# Patient Record
Sex: FEMALE | Race: WHITE | URBAN - METROPOLITAN AREA
[De-identification: names, ages, dates, MRNs, and addresses within clinical notes are randomized per-mention and may not be internally consistent; named-entity substitution may affect disease eponyms.]

---

## 2021-05-25 PROBLEM — F11.21 OPIOID TYPE DEPENDENCE IN REMISSION (HCC): Status: ACTIVE | Noted: 2021-05-25

## 2022-01-05 PROBLEM — R53.83 FATIGUE, UNSPECIFIED TYPE: Status: ACTIVE | Noted: 2022-01-05

## 2022-05-26 LAB
AMB EXT CHLAMYDIA, NUCLEIC ACID AMP: NEGATIVE
AMB EXT GONOCOCCUS, NUCLEIC ACID AMP: NEGATIVE

## 2022-06-10 LAB
AMB EXT TREPONEMAL ANTIBODIES: NON REACTIVE
ANTIBODY SCREEN OB: NEGATIVE
HEPATITIS B SURFACE ANTIGEN OB: NEGATIVE
HIV RESULT OB: NEGATIVE
RH FACTOR OB: NEGATIVE

## 2022-07-22 ENCOUNTER — TELEPHONE (OUTPATIENT)
Dept: PERINATAL CARE | Facility: HOSPITAL | Age: 42
End: 2022-07-22

## 2022-08-15 ENCOUNTER — HOSPITAL ENCOUNTER (OUTPATIENT)
Dept: PERINATAL CARE | Facility: HOSPITAL | Age: 42
End: 2022-08-15
Attending: OBSTETRICS & GYNECOLOGY
Payer: COMMERCIAL

## 2022-08-15 ENCOUNTER — HOSPITAL ENCOUNTER (OUTPATIENT)
Dept: PERINATAL CARE | Facility: HOSPITAL | Age: 42
Discharge: HOME | End: 2022-08-15
Attending: OBSTETRICS & GYNECOLOGY
Payer: COMMERCIAL

## 2022-08-15 VITALS
HEART RATE: 93 BPM | SYSTOLIC BLOOD PRESSURE: 122 MMHG | BODY MASS INDEX: 33.57 KG/M2 | HEIGHT: 60 IN | WEIGHT: 171 LBS | DIASTOLIC BLOOD PRESSURE: 73 MMHG

## 2022-08-15 DIAGNOSIS — O35.9XX0 FETAL ABNORMALITY AFFECTING MANAGEMENT OF MOTHER, SINGLE OR UNSPECIFIED FETUS: ICD-10-CM

## 2022-08-15 DIAGNOSIS — F11.21 OPIOID TYPE DEPENDENCE IN REMISSION (HCC): ICD-10-CM

## 2022-08-15 DIAGNOSIS — E66.9 CLASS 1 OBESITY WITHOUT SERIOUS COMORBIDITY WITH BODY MASS INDEX (BMI) OF 33.0 TO 33.9 IN ADULT, UNSPECIFIED OBESITY TYPE: ICD-10-CM

## 2022-08-15 DIAGNOSIS — Z36.89 SCREENING, ANTENATAL, FOR FETAL ANATOMIC SURVEY: ICD-10-CM

## 2022-08-15 DIAGNOSIS — O09.512 AMA (ADVANCED MATERNAL AGE) PRIMIGRAVIDA 35+, SECOND TRIMESTER: Primary | ICD-10-CM

## 2022-08-15 DIAGNOSIS — Z03.75 SUSPECTED SHORTENING OF CERVIX NOT FOUND: ICD-10-CM

## 2022-08-15 PROCEDURE — 76817 TRANSVAGINAL US OBSTETRIC: CPT

## 2022-08-15 PROCEDURE — 76811 OB US DETAILED SNGL FETUS: CPT | Performed by: OBSTETRICS & GYNECOLOGY

## 2022-08-24 ENCOUNTER — HOSPITAL ENCOUNTER (OUTPATIENT)
Dept: PERINATAL CARE | Facility: HOSPITAL | Age: 42
Discharge: HOME | End: 2022-08-24
Attending: OBSTETRICS & GYNECOLOGY
Payer: COMMERCIAL

## 2022-08-24 VITALS
DIASTOLIC BLOOD PRESSURE: 59 MMHG | HEART RATE: 94 BPM | SYSTOLIC BLOOD PRESSURE: 127 MMHG | BODY MASS INDEX: 34 KG/M2 | WEIGHT: 172 LBS

## 2022-08-24 DIAGNOSIS — O26.873 CERVICAL SHORTENING AFFECTING PREGNANCY IN THIRD TRIMESTER: Primary | ICD-10-CM

## 2022-08-24 DIAGNOSIS — O26.873 CERVICAL SHORTENING AFFECTING PREGNANCY IN THIRD TRIMESTER: ICD-10-CM

## 2022-08-24 DIAGNOSIS — O09.512 AMA (ADVANCED MATERNAL AGE) PRIMIGRAVIDA 35+, SECOND TRIMESTER: ICD-10-CM

## 2022-08-24 DIAGNOSIS — E66.9 CLASS 1 OBESITY WITHOUT SERIOUS COMORBIDITY WITH BODY MASS INDEX (BMI) OF 33.0 TO 33.9 IN ADULT, UNSPECIFIED OBESITY TYPE: ICD-10-CM

## 2022-08-24 PROCEDURE — 76817 TRANSVAGINAL US OBSTETRIC: CPT | Performed by: OBSTETRICS & GYNECOLOGY

## 2022-08-24 PROCEDURE — 76815 OB US LIMITED FETUS(S): CPT

## 2022-09-02 ENCOUNTER — HOSPITAL ENCOUNTER (OUTPATIENT)
Dept: PERINATAL CARE | Facility: HOSPITAL | Age: 42
Discharge: HOME | End: 2022-09-02
Attending: OBSTETRICS & GYNECOLOGY
Payer: COMMERCIAL

## 2022-09-02 VITALS
SYSTOLIC BLOOD PRESSURE: 136 MMHG | BODY MASS INDEX: 34 KG/M2 | HEART RATE: 93 BPM | WEIGHT: 172 LBS | DIASTOLIC BLOOD PRESSURE: 76 MMHG

## 2022-09-02 DIAGNOSIS — O26.879 SHORT CERVIX AFFECTING PREGNANCY: ICD-10-CM

## 2022-09-02 DIAGNOSIS — O09.512 AMA (ADVANCED MATERNAL AGE) PRIMIGRAVIDA 35+, SECOND TRIMESTER: Primary | ICD-10-CM

## 2022-09-02 DIAGNOSIS — O09.512 AMA (ADVANCED MATERNAL AGE) PRIMIGRAVIDA 35+, SECOND TRIMESTER: ICD-10-CM

## 2022-09-02 PROCEDURE — 76816 OB US FOLLOW-UP PER FETUS: CPT | Performed by: OBSTETRICS & GYNECOLOGY

## 2022-09-02 PROCEDURE — 76817 TRANSVAGINAL US OBSTETRIC: CPT

## 2022-09-02 RX ORDER — PROGESTERONE 200 MG/1
200 CAPSULE ORAL NIGHTLY
Qty: 30 CAPSULE | Refills: 6 | Status: SHIPPED | OUTPATIENT
Start: 2022-09-02

## 2022-09-27 LAB
AMB EXT TREPONEMAL ANTIBODIES: NON REACTIVE
HIV RESULT OB: NEGATIVE

## 2022-10-28 ENCOUNTER — HOSPITAL ENCOUNTER (OUTPATIENT)
Dept: PERINATAL CARE | Facility: HOSPITAL | Age: 42
Discharge: HOME | End: 2022-10-28
Attending: OBSTETRICS & GYNECOLOGY
Payer: COMMERCIAL

## 2022-10-28 DIAGNOSIS — E66.9 CLASS 1 OBESITY WITHOUT SERIOUS COMORBIDITY WITH BODY MASS INDEX (BMI) OF 33.0 TO 33.9 IN ADULT, UNSPECIFIED OBESITY TYPE: ICD-10-CM

## 2022-10-28 DIAGNOSIS — F11.21 OPIOID TYPE DEPENDENCE IN REMISSION (HCC): ICD-10-CM

## 2022-10-28 DIAGNOSIS — O09.512 AMA (ADVANCED MATERNAL AGE) PRIMIGRAVIDA 35+, SECOND TRIMESTER: ICD-10-CM

## 2022-10-31 ENCOUNTER — TELEPHONE (OUTPATIENT)
Dept: PERINATAL CARE | Facility: HOSPITAL | Age: 42
End: 2022-10-31

## 2022-10-31 ENCOUNTER — HOSPITAL ENCOUNTER (OUTPATIENT)
Dept: PERINATAL CARE | Facility: HOSPITAL | Age: 42
Discharge: HOME | End: 2022-10-31
Attending: OBSTETRICS & GYNECOLOGY
Payer: COMMERCIAL

## 2022-10-31 ENCOUNTER — HOSPITAL ENCOUNTER (OUTPATIENT)
Dept: PERINATAL CARE | Facility: HOSPITAL | Age: 42
End: 2022-10-31
Attending: OBSTETRICS & GYNECOLOGY
Payer: COMMERCIAL

## 2022-10-31 VITALS
DIASTOLIC BLOOD PRESSURE: 71 MMHG | WEIGHT: 184 LBS | HEART RATE: 93 BPM | SYSTOLIC BLOOD PRESSURE: 140 MMHG | BODY MASS INDEX: 36 KG/M2

## 2022-10-31 DIAGNOSIS — O09.512 AMA (ADVANCED MATERNAL AGE) PRIMIGRAVIDA 35+, SECOND TRIMESTER: Primary | ICD-10-CM

## 2022-10-31 DIAGNOSIS — E66.9 CLASS 1 OBESITY WITHOUT SERIOUS COMORBIDITY WITH BODY MASS INDEX (BMI) OF 33.0 TO 33.9 IN ADULT, UNSPECIFIED OBESITY TYPE: ICD-10-CM

## 2022-10-31 DIAGNOSIS — O09.512 AMA (ADVANCED MATERNAL AGE) PRIMIGRAVIDA 35+, SECOND TRIMESTER: ICD-10-CM

## 2022-10-31 DIAGNOSIS — F11.21 OPIOID TYPE DEPENDENCE IN REMISSION (HCC): ICD-10-CM

## 2022-10-31 DIAGNOSIS — O24.414 INSULIN CONTROLLED GESTATIONAL DIABETES MELLITUS (GDM) IN THIRD TRIMESTER: ICD-10-CM

## 2022-10-31 PROCEDURE — 76819 FETAL BIOPHYS PROFIL W/O NST: CPT

## 2022-10-31 PROCEDURE — 76816 OB US FOLLOW-UP PER FETUS: CPT | Performed by: OBSTETRICS & GYNECOLOGY

## 2022-10-31 RX ORDER — INSULIN DETEMIR 100 [IU]/ML
20 INJECTION, SOLUTION SUBCUTANEOUS NIGHTLY
Qty: 18 ML | Refills: 0 | Status: SHIPPED | OUTPATIENT
Start: 2022-10-31 | End: 2023-01-29

## 2022-10-31 NOTE — TELEPHONE ENCOUNTER
Pt contacted regarding use of blood glucose log. Pt aware to record sugars daily and that MFM will be reviewing weekly.

## 2022-11-07 ENCOUNTER — TELEPHONE (OUTPATIENT)
Dept: PERINATAL CARE | Facility: HOSPITAL | Age: 42
End: 2022-11-07

## 2022-11-07 RX ORDER — INSULIN DETEMIR 100 [IU]/ML
36 INJECTION, SOLUTION SUBCUTANEOUS NIGHTLY
Qty: 15 ML | Refills: 1 | Status: SHIPPED | OUTPATIENT
Start: 2022-11-07 | End: 2023-02-05

## 2022-11-07 NOTE — TELEPHONE ENCOUNTER
Pt  33 4/7 weeks  Blood glucose log from  11/1-11/6     Low  High Out of Range   Fasting Blood Sugar 95 108 6/6   Post Breakfast 110 204 4/6   Post Lunch 117 149 3/6   Post Dinner 103 167 5/6     Pt states post lunch reading of 204 mg/dl was due to drinking juice    Taking    levemir 20 untis QHS

## 2022-11-14 ENCOUNTER — TELEPHONE (OUTPATIENT)
Dept: PERINATAL CARE | Facility: HOSPITAL | Age: 42
End: 2022-11-14

## 2022-11-14 RX ORDER — INSULIN DETEMIR 100 [IU]/ML
42 INJECTION, SOLUTION SUBCUTANEOUS NIGHTLY
Qty: 12.6 ML | Refills: 2 | Status: SHIPPED | OUTPATIENT
Start: 2022-11-14 | End: 2023-02-12

## 2022-11-14 NOTE — TELEPHONE ENCOUNTER
Pt  34 4/7 weeks  Blood glucose log from  11/7-11/13     Low  High Out of Range   Fasting Blood Sugar 90 100 4/5   Post Breakfast 108 129 1/5   Post Lunch 115 130 4/5   Post Dinner 116 131 3/5       Taking    Levemir 36 units QHS

## 2022-11-21 ENCOUNTER — TELEPHONE (OUTPATIENT)
Dept: PERINATAL CARE | Facility: HOSPITAL | Age: 42
End: 2022-11-21

## 2022-11-22 LAB — STREP GP B CULT OB: NEGATIVE

## 2022-11-29 ENCOUNTER — HOSPITAL ENCOUNTER (OUTPATIENT)
Dept: PERINATAL CARE | Facility: HOSPITAL | Age: 42
Discharge: HOME | End: 2022-11-29
Attending: OBSTETRICS & GYNECOLOGY
Payer: COMMERCIAL

## 2022-11-29 VITALS
BODY MASS INDEX: 37 KG/M2 | SYSTOLIC BLOOD PRESSURE: 140 MMHG | DIASTOLIC BLOOD PRESSURE: 69 MMHG | WEIGHT: 188 LBS | HEART RATE: 88 BPM

## 2022-11-29 DIAGNOSIS — O26.873 CERVICAL SHORTENING AFFECTING PREGNANCY IN THIRD TRIMESTER: ICD-10-CM

## 2022-11-29 DIAGNOSIS — O09.512 AMA (ADVANCED MATERNAL AGE) PRIMIGRAVIDA 35+, SECOND TRIMESTER: ICD-10-CM

## 2022-11-29 DIAGNOSIS — E66.9 CLASS 1 OBESITY WITHOUT SERIOUS COMORBIDITY WITH BODY MASS INDEX (BMI) OF 33.0 TO 33.9 IN ADULT, UNSPECIFIED OBESITY TYPE: ICD-10-CM

## 2022-11-29 DIAGNOSIS — O24.414 INSULIN CONTROLLED GESTATIONAL DIABETES MELLITUS (GDM) IN THIRD TRIMESTER: ICD-10-CM

## 2022-11-29 DIAGNOSIS — O09.512 AMA (ADVANCED MATERNAL AGE) PRIMIGRAVIDA 35+, SECOND TRIMESTER: Primary | ICD-10-CM

## 2022-11-29 PROCEDURE — 76819 FETAL BIOPHYS PROFIL W/O NST: CPT

## 2022-11-29 PROCEDURE — 76816 OB US FOLLOW-UP PER FETUS: CPT | Performed by: OBSTETRICS & GYNECOLOGY

## 2022-12-03 ENCOUNTER — ORDER TRANSCRIPTION (OUTPATIENT)
Dept: ADMINISTRATIVE | Facility: HOSPITAL | Age: 42
End: 2022-12-03

## 2022-12-03 DIAGNOSIS — Z01.818 PRE-OP EXAMINATION: Primary | ICD-10-CM

## 2022-12-05 ENCOUNTER — LAB ENCOUNTER (OUTPATIENT)
Dept: LAB | Facility: HOSPITAL | Age: 42
End: 2022-12-05
Attending: OBSTETRICS & GYNECOLOGY
Payer: COMMERCIAL

## 2022-12-05 DIAGNOSIS — Z01.818 PRE-OP EXAMINATION: ICD-10-CM

## 2022-12-06 ENCOUNTER — HOSPITAL ENCOUNTER (OUTPATIENT)
Facility: HOSPITAL | Age: 42
Discharge: HOME | End: 2022-12-06
Attending: OBSTETRICS & GYNECOLOGY | Admitting: OBSTETRICS & GYNECOLOGY
Payer: COMMERCIAL

## 2022-12-06 ENCOUNTER — TELEPHONE (OUTPATIENT)
Dept: PERINATAL CARE | Facility: HOSPITAL | Age: 42
End: 2022-12-06

## 2022-12-06 LAB — SARS-COV-2 RNA RESP QL NAA+PROBE: NOT DETECTED

## 2022-12-06 PROCEDURE — 99212 OFFICE O/P EST SF 10 MIN: CPT

## 2022-12-06 PROCEDURE — 59025 FETAL NON-STRESS TEST: CPT

## 2022-12-06 NOTE — TRIAGE
[facility]      T[facility] Note    Chris Lowe Patient Status:  Outpatient    1980 MRN [id number]   Location [address] Attending Octavio Betts MD   Hosp Day # 0 PCP No primary care provider on file. Tinnie Phalen: [phone]  Estimated Date of Delivery: 22  Gestation: 37w5d    Chief Complaint    Non-stress Test         Allergies:  No Known Allergies    No orders of the defined types were placed in this encounter. No results found for: WBC, HGB, HCT, PLT, CREATSERUM, BUN, NA, K, CL, CO2, GLU, CA, ALB, ALKPHO, BILT, TP, AST, ALT, PTT, INR, PT, T4F, TSH, TSHREFLEX, HARLAN, LIP, GGT, PSA, DDIMER, ESRML, ESRPF, CRP, BNP, MG, PHOS, TROP, TROPHS, CK, CKMB, GILMAR, RPR, B12, ETOH, POCGLU, FFN    Clinitek UA  No results found for: Anika Duty, GLUUR, URINEBILI, URINEKETONE, SPECGRAVITY, PHUR, PROTURINE, UROBILI, URINENITRITE, URINELEUK, URINECUL    UA  No results found for: Alma Laws, SPECGRAVITY, PROUR, GLUUR, KETUR, BILUR, BLOODURINE, NITRITE, UROBILINOGEN, LEUUR, UASA    There were no vitals filed for this visit. NST  Variability: Moderate           Accelerations: Yes           Decelerations: None            Baseline: 145 BPM           Uterine Irritability: No           Contractions: Irregular                    Contraction Frequency: few                   Acoustic Stimulator: No           Nonstress Test Interpretation: Reactive           Nonstress Test Second Interpretation: Reactive          FHR Category: Category I             Additional Comments Comments: Reviewed case with Dr Elijah العلي. Dc'd home to f/u with initial plan of care.  IOL this week for A2GDM     Patient presents with:  Non-stress Test: Fetal tachycardia in office        Amy Ruiz RN  2022 12:35 PM

## 2022-12-06 NOTE — TELEPHONE ENCOUNTER
37w5d  Received BS log for date range 11/30-12/3     Low  High Out of Range   Fasting Blood Sugar 92 101 2/4   Post Breakfast 112 127 2/4   Post Lunch 107 130 1/4   Post Dinner 118 135 3/4     Patient is currently taking        Levemir 50 at bedtime    IOL 12/8

## 2022-12-06 NOTE — TELEPHONE ENCOUNTER
No change - continue current regimen. Continue Insulin  Levemir 50 at bedtime    Sherron Mckay. Maynor Pena M.D.   Maternal-Fetal Medicine

## 2022-12-06 NOTE — PROGRESS NOTES
Pt is a 40year old female admitted to [facility]. Patient presents with:  Non-stress Test: Fetal tachycardia in office     Pt is  37w5d intra-uterine pregnancy. History obtained, consents signed. Oriented to room, staff, and plan of care.

## 2022-12-08 ENCOUNTER — ANESTHESIA (OUTPATIENT)
Dept: OBGYN UNIT | Facility: HOSPITAL | Age: 42
End: 2022-12-08
Payer: COMMERCIAL

## 2022-12-08 ENCOUNTER — ANESTHESIA EVENT (OUTPATIENT)
Dept: OBGYN UNIT | Facility: HOSPITAL | Age: 42
End: 2022-12-08
Payer: COMMERCIAL

## 2022-12-08 ENCOUNTER — APPOINTMENT (OUTPATIENT)
Dept: OBGYN CLINIC | Facility: HOSPITAL | Age: 42
End: 2022-12-08
Attending: OBSTETRICS & GYNECOLOGY
Payer: COMMERCIAL

## 2022-12-08 ENCOUNTER — HOSPITAL ENCOUNTER (INPATIENT)
Facility: HOSPITAL | Age: 42
LOS: 1 days | Discharge: HOME | End: 2022-12-09
Attending: OBSTETRICS & GYNECOLOGY | Admitting: OBSTETRICS & GYNECOLOGY
Payer: COMMERCIAL

## 2022-12-08 PROBLEM — Z34.90 PREGNANCY: Status: ACTIVE | Noted: 2022-12-08

## 2022-12-08 LAB
ANTIBODY SCREEN: POSITIVE
BASOPHILS # BLD AUTO: 0.1 X10(3) UL (ref 0–0.2)
BASOPHILS NFR BLD AUTO: 0.6 %
DEPRECATED RDW RBC AUTO: 42.9 FL (ref 35.1–46.3)
DIRECT COOMBS POLY: NEGATIVE
EOSINOPHIL # BLD AUTO: 0.29 X10(3) UL (ref 0–0.7)
EOSINOPHIL NFR BLD AUTO: 1.6 %
ERYTHROCYTE [DISTWIDTH] IN BLOOD BY AUTOMATED COUNT: 13.1 % (ref 11–15)
GLUCOSE BLDC GLUCOMTR-MCNC: 115 MG/DL (ref 70–99)
GLUCOSE BLDC GLUCOMTR-MCNC: 83 MG/DL (ref 70–99)
GLUCOSE BLDC GLUCOMTR-MCNC: 88 MG/DL (ref 70–99)
GLUCOSE BLDC GLUCOMTR-MCNC: 94 MG/DL (ref 70–99)
HCT VFR BLD AUTO: 31.5 %
HGB BLD-MCNC: 10.5 G/DL
IMM GRANULOCYTES # BLD AUTO: 0.12 X10(3) UL (ref 0–1)
IMM GRANULOCYTES NFR BLD: 0.7 %
LYMPHOCYTES # BLD AUTO: 3.36 X10(3) UL (ref 1–4)
LYMPHOCYTES NFR BLD AUTO: 18.8 %
MCH RBC QN AUTO: 30 PG (ref 26–34)
MCHC RBC AUTO-ENTMCNC: 33.3 G/DL (ref 31–37)
MCV RBC AUTO: 90 FL
MONOCYTES # BLD AUTO: 0.88 X10(3) UL (ref 0.1–1)
MONOCYTES NFR BLD AUTO: 4.9 %
NEUTROPHILS # BLD AUTO: 13.16 X10 (3) UL (ref 1.5–7.7)
NEUTROPHILS # BLD AUTO: 13.16 X10(3) UL (ref 1.5–7.7)
NEUTROPHILS NFR BLD AUTO: 73.4 %
PLATELET # BLD AUTO: 346 10(3)UL (ref 150–450)
RBC # BLD AUTO: 3.5 X10(6)UL
RH BLOOD TYPE: NEGATIVE
RH BLOOD TYPE: NEGATIVE
WBC # BLD AUTO: 17.9 X10(3) UL (ref 4–11)

## 2022-12-08 PROCEDURE — 3E033VJ INTRODUCTION OF OTHER HORMONE INTO PERIPHERAL VEIN, PERCUTANEOUS APPROACH: ICD-10-PCS | Performed by: OBSTETRICS & GYNECOLOGY

## 2022-12-08 PROCEDURE — 85025 COMPLETE CBC W/AUTO DIFF WBC: CPT | Performed by: OBSTETRICS & GYNECOLOGY

## 2022-12-08 PROCEDURE — 10907ZC DRAINAGE OF AMNIOTIC FLUID, THERAPEUTIC FROM PRODUCTS OF CONCEPTION, VIA NATURAL OR ARTIFICIAL OPENING: ICD-10-PCS | Performed by: OBSTETRICS & GYNECOLOGY

## 2022-12-08 PROCEDURE — 86880 COOMBS TEST DIRECT: CPT | Performed by: OBSTETRICS & GYNECOLOGY

## 2022-12-08 PROCEDURE — 85461 HEMOGLOBIN FETAL: CPT | Performed by: OBSTETRICS & GYNECOLOGY

## 2022-12-08 PROCEDURE — 86870 RBC ANTIBODY IDENTIFICATION: CPT | Performed by: OBSTETRICS & GYNECOLOGY

## 2022-12-08 PROCEDURE — 0KQM0ZZ REPAIR PERINEUM MUSCLE, OPEN APPROACH: ICD-10-PCS | Performed by: OBSTETRICS & GYNECOLOGY

## 2022-12-08 PROCEDURE — 82962 GLUCOSE BLOOD TEST: CPT

## 2022-12-08 PROCEDURE — 86850 RBC ANTIBODY SCREEN: CPT | Performed by: OBSTETRICS & GYNECOLOGY

## 2022-12-08 PROCEDURE — 86077 PHYS BLOOD BANK SERV XMATCH: CPT | Performed by: OBSTETRICS & GYNECOLOGY

## 2022-12-08 PROCEDURE — 86901 BLOOD TYPING SEROLOGIC RH(D): CPT | Performed by: OBSTETRICS & GYNECOLOGY

## 2022-12-08 PROCEDURE — 86900 BLOOD TYPING SEROLOGIC ABO: CPT | Performed by: OBSTETRICS & GYNECOLOGY

## 2022-12-08 RX ORDER — DOCUSATE SODIUM 100 MG/1
100 CAPSULE, LIQUID FILLED ORAL
Status: DISCONTINUED | OUTPATIENT
Start: 2022-12-08 | End: 2022-12-09

## 2022-12-08 RX ORDER — IBUPROFEN 600 MG/1
600 TABLET ORAL EVERY 6 HOURS PRN
Status: DISCONTINUED | OUTPATIENT
Start: 2022-12-08 | End: 2022-12-08 | Stop reason: HOSPADM

## 2022-12-08 RX ORDER — LIDOCAINE HYDROCHLORIDE 10 MG/ML
30 INJECTION, SOLUTION EPIDURAL; INFILTRATION; INTRACAUDAL; PERINEURAL ONCE
Status: DISCONTINUED | OUTPATIENT
Start: 2022-12-08 | End: 2022-12-08 | Stop reason: HOSPADM

## 2022-12-08 RX ORDER — BUPIVACAINE HCL/0.9 % NACL/PF 0.25 %
5 PLASTIC BAG, INJECTION (ML) EPIDURAL AS NEEDED
Status: DISCONTINUED | OUTPATIENT
Start: 2022-12-08 | End: 2022-12-08

## 2022-12-08 RX ORDER — DIAPER,BRIEF,INFANT-TODD,DISP
1 EACH MISCELLANEOUS EVERY 6 HOURS PRN
Status: DISCONTINUED | OUTPATIENT
Start: 2022-12-08 | End: 2022-12-09

## 2022-12-08 RX ORDER — BUPIVACAINE HYDROCHLORIDE 2.5 MG/ML
20 INJECTION, SOLUTION EPIDURAL; INFILTRATION; INTRACAUDAL ONCE
Status: COMPLETED | OUTPATIENT
Start: 2022-12-08 | End: 2022-12-08

## 2022-12-08 RX ORDER — AMMONIA INHALANTS 0.04 G/.3ML
0.3 INHALANT RESPIRATORY (INHALATION) AS NEEDED
Status: DISCONTINUED | OUTPATIENT
Start: 2022-12-08 | End: 2022-12-09

## 2022-12-08 RX ORDER — ONDANSETRON 2 MG/ML
4 INJECTION INTRAMUSCULAR; INTRAVENOUS EVERY 6 HOURS PRN
Status: DISCONTINUED | OUTPATIENT
Start: 2022-12-08 | End: 2022-12-09

## 2022-12-08 RX ORDER — DEXTROSE, SODIUM CHLORIDE, SODIUM LACTATE, POTASSIUM CHLORIDE, AND CALCIUM CHLORIDE 5; .6; .31; .03; .02 G/100ML; G/100ML; G/100ML; G/100ML; G/100ML
INJECTION, SOLUTION INTRAVENOUS AS NEEDED
Status: DISCONTINUED | OUTPATIENT
Start: 2022-12-08 | End: 2022-12-08 | Stop reason: HOSPADM

## 2022-12-08 RX ORDER — ACETAMINOPHEN 500 MG
500 TABLET ORAL EVERY 6 HOURS PRN
Status: DISCONTINUED | OUTPATIENT
Start: 2022-12-08 | End: 2022-12-08 | Stop reason: HOSPADM

## 2022-12-08 RX ORDER — TRISODIUM CITRATE DIHYDRATE AND CITRIC ACID MONOHYDRATE 500; 334 MG/5ML; MG/5ML
30 SOLUTION ORAL AS NEEDED
Status: DISCONTINUED | OUTPATIENT
Start: 2022-12-08 | End: 2022-12-08 | Stop reason: HOSPADM

## 2022-12-08 RX ORDER — SIMETHICONE 80 MG
80 TABLET,CHEWABLE ORAL 3 TIMES DAILY PRN
Status: DISCONTINUED | OUTPATIENT
Start: 2022-12-08 | End: 2022-12-09

## 2022-12-08 RX ORDER — ONDANSETRON 2 MG/ML
4 INJECTION INTRAMUSCULAR; INTRAVENOUS EVERY 6 HOURS PRN
Status: DISCONTINUED | OUTPATIENT
Start: 2022-12-08 | End: 2022-12-08 | Stop reason: HOSPADM

## 2022-12-08 RX ORDER — LIDOCAINE HYDROCHLORIDE AND EPINEPHRINE 15; 5 MG/ML; UG/ML
INJECTION, SOLUTION EPIDURAL
Status: COMPLETED | OUTPATIENT
Start: 2022-12-08 | End: 2022-12-08

## 2022-12-08 RX ORDER — SODIUM CHLORIDE, SODIUM LACTATE, POTASSIUM CHLORIDE, CALCIUM CHLORIDE 600; 310; 30; 20 MG/100ML; MG/100ML; MG/100ML; MG/100ML
INJECTION, SOLUTION INTRAVENOUS CONTINUOUS
Status: DISCONTINUED | OUTPATIENT
Start: 2022-12-08 | End: 2022-12-08 | Stop reason: HOSPADM

## 2022-12-08 RX ORDER — NALBUPHINE HCL 10 MG/ML
2.5 AMPUL (ML) INJECTION
Status: DISCONTINUED | OUTPATIENT
Start: 2022-12-08 | End: 2022-12-08

## 2022-12-08 RX ORDER — ACETAMINOPHEN 500 MG
1000 TABLET ORAL EVERY 6 HOURS PRN
Status: DISCONTINUED | OUTPATIENT
Start: 2022-12-08 | End: 2022-12-09

## 2022-12-08 RX ORDER — CHOLECALCIFEROL (VITAMIN D3) 25 MCG
1 TABLET,CHEWABLE ORAL DAILY
Status: DISCONTINUED | OUTPATIENT
Start: 2022-12-08 | End: 2022-12-09

## 2022-12-08 RX ORDER — ACETAMINOPHEN 500 MG
500 TABLET ORAL EVERY 6 HOURS PRN
Status: DISCONTINUED | OUTPATIENT
Start: 2022-12-08 | End: 2022-12-09

## 2022-12-08 RX ORDER — TERBUTALINE SULFATE 1 MG/ML
0.25 INJECTION, SOLUTION SUBCUTANEOUS AS NEEDED
Status: DISCONTINUED | OUTPATIENT
Start: 2022-12-08 | End: 2022-12-08 | Stop reason: HOSPADM

## 2022-12-08 RX ORDER — BISACODYL 10 MG
10 SUPPOSITORY, RECTAL RECTAL ONCE AS NEEDED
Status: DISCONTINUED | OUTPATIENT
Start: 2022-12-08 | End: 2022-12-09

## 2022-12-08 RX ORDER — AMMONIA INHALANTS 0.04 G/.3ML
0.3 INHALANT RESPIRATORY (INHALATION) AS NEEDED
Status: DISCONTINUED | OUTPATIENT
Start: 2022-12-08 | End: 2022-12-08 | Stop reason: HOSPADM

## 2022-12-08 RX ORDER — IBUPROFEN 600 MG/1
600 TABLET ORAL EVERY 6 HOURS
Status: DISCONTINUED | OUTPATIENT
Start: 2022-12-08 | End: 2022-12-09

## 2022-12-08 RX ADMIN — LIDOCAINE HYDROCHLORIDE AND EPINEPHRINE 3 ML: 15; 5 INJECTION, SOLUTION EPIDURAL at 12:01:00

## 2022-12-08 RX ADMIN — BUPIVACAINE HYDROCHLORIDE 5 ML: 2.5 INJECTION, SOLUTION EPIDURAL; INFILTRATION; INTRACAUDAL at 12:05:00

## 2022-12-08 NOTE — L&D DELIVERY NOTE
Ant Butler, Girl [[id number]]    Labor Events     labor?: No   steroids?: None  Antibiotics received during labor?: No  Antibiotics (enter # doses in comment): none  Rupture date/time: 2022 1245     Rupture type: AROM  Fluid color: Clear  Induction: Oxytocin, AROM  Indications for induction: IDDM/GDDM  Augmentation: None  Intrapartum & labor complications: Other - see comments  Intrapartum & labor complications comment: Single umbilical artery      Labor Event Times    Labor onset date/time: 2022 1045  Dilation complete date/time: 2022 1545  Start pushing date/time: 2022 1554     [place] Presentation    Presentation: Vertex  Position: Occiput Anterior     Operative Delivery    Operative Vaginal Delivery: No            Shoulder Dystocia    Shoulder Dystocia: No     Anesthesia    Method: Epidural           Delivery    Head delivery date/time: 2022 16:14:27   Delivery date/time:  22 16:14:31   Delivery type: Normal spontaneous vaginal delivery    Details:     Delivery location: delivery room  Delivery Room Temperature: 73     Delivery Providers    Delivering Clinician: Sanjuana Ya MD   Delivery personnel:  Provider Role   Elizabeth Hernandez, NICOLLE Baby Nurse   Johnathon Ro, RN Delivery Nurse   Vannesa Palmer, RN Delivery Nurse         Cord    Vessels: 2 Vessels  Complications:  Body cord  # of loops: 1  Timed cord clamping: Yes  Time in sec: 30  Cord blood disposition: to lab  Gases sent?: No     Resuscitation    Method: None      Measurements    Weight: 2920 g 6 lb 7 oz Length: 50.5 cm   Head circum.: 32.5 cm          Placenta    Date/time: 2022 1620  Removal: Spontaneous  Appearance: Intact  Disposition: Pathology     Apgars    Living status: Living   Apgar Scoring Key:    0 1 2    Skin color Blue or pale Acrocyanotic Completely pink    Heart rate Absent <100 bpm >100 bpm    Reflex irritability No response Grimace Cry or active withdrawal Muscle tone Limp Some flexion Active motion    Respiratory effort Absent Weak cry; hypoventilation Good, crying              1 Minute:  5 Minute:  10 Minute:  15 Minute:  20 Minute:    Skin color: 0  1       Heart rate: 2  2       Reflex irritablity: 2  2       Muscle tone: 2  2       Respiratory effort: 2  2       Total: 8  9          Apgars assigned by: [place] RN  [place] disposition: with mother     Skin to Skin    Skin to skin initiated date/time: 2022 1614  Skin to skin with: Mother     Vaginal Count    Initial count RN: Damien Alvarez RN  Initial count Tech: Nile Marrow, Ilona   Sponges   Sharps    Initial counts 10   0    Final counts 10   1    Final count RN: Damien Alvarez RN  Final count MD: Miguelito Samano MD     Delivery (Maternal)    Episiotomy: None  Perineal lacerations: 2nd Repaired?: Yes   Vaginal laceration?: No    Cervical laceration?: No    Clitoral laceration?: No    Quantitative blood loss (mL): [address]    Vaginal Delivery Note    [facility] Patient Status:  Inpatient    1980 MRN [id number]   Location [address] [place] [address] Attending Miguelito Samano MD   Hosp Day # 0 PCP No primary care provider on file.      Delivery     Infant  Date of Delivery: 2022   Time of Delivery: 4:14 PM  Delivery Type: Normal spontaneous vaginal delivery    Infant Sex  Information for the patient's : Ap Wiggins Girl [[id number]]   female    Infant Birthweight  Information for the patient's : Velnadine Fat, Girl [[id number]]   6 lb 7 oz (2.92 kg)     Presentation Vertex [1]  Position   Occiput [1] Anterior [1]    Apgars:  1 minute: 8               5 minutes: 9                        10 minutes:      Placenta:  Date/Time of Delivery: 2022  4:20 PM   Delivery: spontaneous  Placenta to Pathology: yes    Umbilical Cord:  Cord Gases Submitted: no  Cord Blood/Tissue Collection: no  Cord Complications: body  Sponge and Needle Counts: Verified    Maternal Anesthesia: epidural     Episiotomy/Laceration Repair  Laceration: perineal second degree    Delivery Complications  none    Neonatologist Present: no    Delivery Narrative: Patient pushed for 15 minutes prior to delivering a live female in OA position over intact perineum after nose & mouth bulb suctioned. Infant then delivered in total. Umbilical cord doubly clamped & cut. Infant handed to awaiting mother. Second degree laceration repaired with 2-0 Vicryl. No cervical / periuretheral / sulcus lacerations. Placenta delivered spontaneously intact & normal in appearance with 3 vessel cord. EBL 300cc. Mother and baby are doing well.       Patrick Stewart MD   12/8/2022  5:09 PM

## 2022-12-08 NOTE — PROGRESS NOTES
Pt is a 40year old female admitted to [place] [address]. Patient presents with:  Scheduled Induction: P1DIC, single umbilical artery      Pt is [id number] 38w0d intra-uterine pregnancy. History obtained, consents signed. Oriented to room, staff, and plan of care.

## 2022-12-08 NOTE — ANESTHESIA PROCEDURE NOTES
Labor Analgesia    Date/Time: 12/8/2022 11:53 AM  Performed by: Mavis Beckman MD  Authorized by: Mavis Beckman MD       General Information and Staff    Start Time:  12/8/2022 11:53 AM  End Time:  12/8/2022 12:05 PM  Anesthesiologist:  Mavis Beckman MD  Performed by:   Anesthesiologist  Patient Location:  OB  Reason for Block: labor epidural    Preanesthetic Checklist: patient identified, IV checked, site marked, risks and benefits discussed, monitors and equipment checked, pre-op evaluation, timeout performed, anesthesia consent and sterile technique used      Procedure Details    Patient Position:  Sitting  Prep: ChloraPrep    Monitoring:  Heart rate  Approach:  Midline    Epidural Needle    Injection Technique:  KAVITA air  Needle Type:  Tuohy  Needle Gauge:  18 G  Needle Length:  3.5 in  Needle Insertion Depth:  6  Location:  L2-3    Spinal Needle      Catheter    Catheter Type:  Multi-orifice  Catheter Size:  20 G  Catheter at Skin Depth:  12  Test Dose:  Negative    Assessment      Additional Comments

## 2022-12-09 VITALS
HEIGHT: 60 IN | SYSTOLIC BLOOD PRESSURE: 118 MMHG | BODY MASS INDEX: 37.65 KG/M2 | TEMPERATURE: 98 F | RESPIRATION RATE: 16 BRPM | DIASTOLIC BLOOD PRESSURE: 72 MMHG | HEART RATE: 82 BPM | OXYGEN SATURATION: 99 % | WEIGHT: 191.75 LBS

## 2022-12-09 LAB
BASOPHILS # BLD AUTO: 0.11 X10(3) UL (ref 0–0.2)
BASOPHILS NFR BLD AUTO: 0.6 %
DEPRECATED RDW RBC AUTO: 43.8 FL (ref 35.1–46.3)
EOSINOPHIL # BLD AUTO: 0.45 X10(3) UL (ref 0–0.7)
EOSINOPHIL NFR BLD AUTO: 2.6 %
ERYTHROCYTE [DISTWIDTH] IN BLOOD BY AUTOMATED COUNT: 13.2 % (ref 11–15)
HCT VFR BLD AUTO: 25.9 %
HGB BLD-MCNC: 8.6 G/DL
IMM GRANULOCYTES # BLD AUTO: 0.09 X10(3) UL (ref 0–1)
IMM GRANULOCYTES NFR BLD: 0.5 %
LYMPHOCYTES # BLD AUTO: 5.61 X10(3) UL (ref 1–4)
LYMPHOCYTES NFR BLD AUTO: 32.4 %
MCH RBC QN AUTO: 30.4 PG (ref 26–34)
MCHC RBC AUTO-ENTMCNC: 33.2 G/DL (ref 31–37)
MCV RBC AUTO: 91.5 FL
MONOCYTES # BLD AUTO: 0.89 X10(3) UL (ref 0.1–1)
MONOCYTES NFR BLD AUTO: 5.1 %
NEUTROPHILS # BLD AUTO: 10.14 X10 (3) UL (ref 1.5–7.7)
NEUTROPHILS # BLD AUTO: 10.14 X10(3) UL (ref 1.5–7.7)
NEUTROPHILS NFR BLD AUTO: 58.8 %
PLATELET # BLD AUTO: 311 10(3)UL (ref 150–450)
RBC # BLD AUTO: 2.83 X10(6)UL
WBC # BLD AUTO: 17.3 X10(3) UL (ref 4–11)

## 2022-12-09 PROCEDURE — 85025 COMPLETE CBC W/AUTO DIFF WBC: CPT | Performed by: OBSTETRICS & GYNECOLOGY

## 2022-12-09 PROCEDURE — 85060 BLOOD SMEAR INTERPRETATION: CPT | Performed by: OBSTETRICS & GYNECOLOGY

## 2022-12-09 RX ORDER — IBUPROFEN 600 MG/1
600 TABLET ORAL EVERY 6 HOURS
Qty: 30 TABLET | Refills: 0 | Status: SHIPPED | OUTPATIENT
Start: 2022-12-09

## 2022-12-09 NOTE — ANESTHESIA POSTPROCEDURE EVALUATION
Patient: Maddie Steele    Procedure Summary     Date: 12/08/22 Room / Location:     Anesthesia Start: 1147 Anesthesia Stop: 0435    Procedure: LABOR ANALGESIA Diagnosis:     Scheduled Providers:  Anesthesiologist: Hortensia Singh MD    Anesthesia Type: epidural ASA Status: 2          Anesthesia Type: epidural        [facility] AN Post Evaluation:   Patient Evaluated in PACU  Patient Participation: complete - patient participated  Level of Consciousness: awake  Pain Management: adequate  Airway Patency:patent  Dental exam unchanged from preop  Yes    Cardiovascular Status: acceptable  Respiratory Status: acceptable  Postoperative Hydration acceptable      Remi Hernandez MD  12/9/2022 6:27 AM

## 2022-12-09 NOTE — PROGRESS NOTES
Patient up to bathroom with assist x 2. Voided 200 ml. Patient transferred to mother/baby room 366 per wheelchair in stable condition with baby and personal belongings. Accompanied by significant other and staff. Report given to Trever Copeland.

## 2022-12-09 NOTE — PLAN OF CARE
Problem: BIRTH - VAGINAL/ SECTION  Goal: Fetal and maternal status remain reassuring during the birth process  Description: INTERVENTIONS:  - Monitor vital signs  - Monitor fetal heart rate  - Monitor uterine activity  - Monitor labor progression (vaginal delivery)  - DVT prophylaxis (C/S delivery)  - Surgical antibiotic prophylaxis (C/S delivery)  Outcome: Progressing     Problem: PAIN - ADULT  Goal: Verbalizes/displays adequate comfort level or patient's stated pain goal  Description: INTERVENTIONS:  - Encourage pt to monitor pain and request assistance  - Assess pain using appropriate pain scale  - Administer analgesics based on type and severity of pain and evaluate response  - Implement non-pharmacological measures as appropriate and evaluate response  - Consider cultural and social influences on pain and pain management  - Manage/alleviate anxiety  - Utilize distraction and/or relaxation techniques  - Monitor for opioid side effects  - Notify MD/LIP if interventions unsuccessful or patient reports new pain  - Anticipate increased pain with activity and pre-medicate as appropriate  Outcome: Progressing     Problem: ANXIETY  Goal: Will report anxiety at manageable levels  Description: INTERVENTIONS:  - Administer medication as ordered  - Teach and rehearse alternative coping skills  - Provide emotional support with 1:1 interaction with staff  Outcome: Progressing     Problem: Patient Centered Care  Goal: Patient preferences are identified and integrated in the patient's plan of care  Description: Interventions:  - What would you like us to know as we care for you?   - Provide timely, complete, and accurate information to patient/family  - Incorporate patient and family knowledge, values, beliefs, and cultural backgrounds into the planning and delivery of care  - Encourage patient/family to participate in care and decision-making at the level they choose  - Honor patient and family perspectives and choices  Outcome: Progressing     Problem: Patient/Family Goals  Goal: Patient/Family Long Term Goal  Description: Patient's Long Term Goal:  Uncomplicated Vaginal Delivery    Interventions:  -Assessment  -Induction/Augmentation per protocol and MD order  -Education  -Intervention per protocol with education  -involve patient/family in POC  -See additional Care Plan goals for specific interventions         Outcome: Progressing  Goal: Patient/Family Short Term Goal  Description: Patient's Short Term Goal:  Comfort and Pain Control    Interventions:  -Non Pharmacological pain interventions  -IV/IM and epidural pain medication per physician order and patient's request  -Education  -Involve patient in POC  Outcome: Progressing

## 2022-12-13 NOTE — PAYOR COMM NOTE
--------------  DISCHARGE REVIEW    Payor: PAMELA ANAYA  Subscriber #:  [id number]  Authorization Number: [id number]    Admit date: 22  Admit time:   7:18 AM  Discharge Date: 2022  6:10 PM       Discharge Summary    Date of Admission: 2022   Date of Discharge: 2022     Admitting Diagnosis: Pregnancy   (normal spontaneous vaginal delivery)    Disposition: Home    Discharge Diagnosis: . Active Problems:    Pregnancy     (normal spontaneous vaginal delivery)

## 2023-01-06 ENCOUNTER — TELEPHONE (OUTPATIENT)
Dept: OBGYN UNIT | Facility: HOSPITAL | Age: 43
End: 2023-01-06

## (undated) NOTE — Clinical Note
Hi Nisa, I ordered insulin and needles. Morgan Monteiro for us to follow. I am sorry I didn't notify vázquez when she was here.